# Patient Record
Sex: FEMALE | HISPANIC OR LATINO | Employment: OTHER | URBAN - METROPOLITAN AREA
[De-identification: names, ages, dates, MRNs, and addresses within clinical notes are randomized per-mention and may not be internally consistent; named-entity substitution may affect disease eponyms.]

---

## 2019-07-05 ENCOUNTER — APPOINTMENT (OUTPATIENT)
Dept: GENERAL RADIOLOGY | Facility: HOSPITAL | Age: 69
End: 2019-07-05

## 2019-07-05 PROCEDURE — 99283 EMERGENCY DEPT VISIT LOW MDM: CPT

## 2019-07-05 PROCEDURE — 96372 THER/PROPH/DIAG INJ SC/IM: CPT

## 2019-07-05 PROCEDURE — 71046 X-RAY EXAM CHEST 2 VIEWS: CPT

## 2019-07-06 ENCOUNTER — HOSPITAL ENCOUNTER (EMERGENCY)
Facility: HOSPITAL | Age: 69
Discharge: HOME | End: 2019-07-06
Attending: EMERGENCY MEDICINE | Admitting: EMERGENCY MEDICINE

## 2019-07-06 VITALS
SYSTOLIC BLOOD PRESSURE: 156 MMHG | BODY MASS INDEX: 27.31 KG/M2 | OXYGEN SATURATION: 95 % | DIASTOLIC BLOOD PRESSURE: 94 MMHG | WEIGHT: 160 LBS | HEIGHT: 64 IN | RESPIRATION RATE: 16 BRPM | HEART RATE: 84 BPM | TEMPERATURE: 98.5 F

## 2019-07-06 DIAGNOSIS — J06.0 ACUTE LARYNGOPHARYNGITIS: Primary | ICD-10-CM

## 2019-07-06 PROCEDURE — 25010000002 TRIAMCINOLONE PER 10 MG: Performed by: NURSE PRACTITIONER

## 2019-07-06 RX ORDER — TRIAMCINOLONE ACETONIDE 40 MG/ML
80 INJECTION, SUSPENSION INTRA-ARTICULAR; INTRAMUSCULAR ONCE
Status: COMPLETED | OUTPATIENT
Start: 2019-07-06 | End: 2019-07-06

## 2019-07-06 RX ORDER — AZITHROMYCIN 250 MG/1
TABLET, FILM COATED ORAL
Qty: 6 TABLET | Refills: 0 | Status: SHIPPED | OUTPATIENT
Start: 2019-07-06

## 2019-07-06 RX ORDER — METHYLPREDNISOLONE 4 MG/1
TABLET ORAL
Qty: 21 TABLET | Refills: 0 | Status: SHIPPED | OUTPATIENT
Start: 2019-07-06

## 2019-07-06 RX ORDER — AZITHROMYCIN 250 MG/1
500 TABLET, FILM COATED ORAL ONCE
Status: COMPLETED | OUTPATIENT
Start: 2019-07-06 | End: 2019-07-06

## 2019-07-06 RX ADMIN — AZITHROMYCIN 500 MG: 250 TABLET, FILM COATED ORAL at 04:13

## 2019-07-06 RX ADMIN — HYDROCODONE POLISTIREX AND CHLORPHENIRAMINE POLISTIREX 5 ML: 10; 8 SUSPENSION, EXTENDED RELEASE ORAL at 04:13

## 2019-07-06 RX ADMIN — TRIAMCINOLONE ACETONIDE 80 MG: 40 INJECTION, SUSPENSION INTRA-ARTICULAR; INTRAMUSCULAR at 04:10

## 2019-07-06 NOTE — DISCHARGE INSTRUCTIONS
Medications as directed.  Do not drive this morning.  Follow-up with your primary care provider to monitor your recovery.  Take your next dose of antibiotics on early Sunday morning.  Drink ample clear fluids.  You may start the steroid Dosepak on Sunday if it is necessary.  Consider taking the cough medication only at nighttime.  Return to the emergency department as needed for worsening symptoms or concerns.  Thank you    Follow up with one of the [facility] Primary Care Providers below to setup primary care. If you need assistance coordinating a primary care appointment with a [facility] Primary Care Provider, please contact the Primary Care Coordinators at [phone] for appointment scheduling.    [facility], Primary Care   [address], [address]   [place], [place] [id number] [phone]    [facility] Internal Medicine & Endocrinology  [address], [address]  [place] [address] [phone]    [facility] Family Medicine  [address], [address]   [place] [address] [phone]    [facility] Primary Care  [address], [address]  [place], [place] [address] [phone]    [facility], Primary Care,   [address], [address]   [place], [place] [address] [phone]    [facility] Primary Care  [address]  [place] [address]   [phone]    [facility], Primary Care  [address], [address]  [place], [place] [id number] [phone]    [facility] Internal Medicine & Pediatrics  [address]   [place] [address] [phone]    [facility], Primary Care  [address] [place] [address] [phone]      [facility] Primary Care  [address] [place] [address], [address]   [place]  [address] [phone]    [facility] Family Medicine  [address] [place] [address] [place] [address] [phone]

## 2019-07-11 NOTE — ED PROVIDER NOTES
Subjective   Patient presents to the emergency department in the company of her daughter who is serving as  to the patient's satisfaction.  Further history reveals that the patient began having a rhinorrhea and mild sore throat 2 days ago with advancing to cough that has been far more frequent in the last evening.  She has had no fever.  She feels short of breath but only when she coughs.  She has been eating, drinking, and voiding well.        History provided by:  Patient   used: No    URI   Presenting symptoms: congestion, cough, fatigue, rhinorrhea and sore throat    Presenting symptoms: no ear pain and no facial pain    Severity:  Moderate  Onset quality:  Gradual  Duration:  3 days  Timing:  Intermittent  Progression:  Worsening  Chronicity:  New  Relieved by:  Nothing  Worsened by:  Nothing  Ineffective treatments:  None tried  Associated symptoms: no arthralgias, no neck pain, no sinus pain and no wheezing    Risk factors: being elderly    Risk factors: no diabetes mellitus and no immunosuppression        Review of Systems   Constitutional: Positive for fatigue.   HENT: Positive for congestion, rhinorrhea and sore throat. Negative for ear pain and sinus pain.    Respiratory: Positive for cough. Negative for wheezing.    Musculoskeletal: Negative for arthralgias and neck pain.   All other systems reviewed and are negative.      Past Medical History:   Diagnosis Date   • Arthrosis        No Known Allergies    Past Surgical History:   Procedure Laterality Date   • CHOLECYSTECTOMY         History reviewed. No pertinent family history.    Social History     Socioeconomic History   • Marital status:      Spouse name: Not on file   • Number of children: Not on file   • Years of education: Not on file   • Highest education level: Not on file           Objective   Physical Exam   Constitutional: She is oriented to person, place, and time. She appears well-developed and  "well-nourished. No distress.   HENT:   Head: Normocephalic.   Mouth/Throat: Oropharynx is clear and moist.   Eyes: Conjunctivae and EOM are normal. Pupils are equal, round, and reactive to light.   Neck: Normal range of motion. Neck supple.   Cardiovascular: Normal rate and regular rhythm.   Pulmonary/Chest: Effort normal and breath sounds normal. No respiratory distress. She has no wheezes. She has no rales.   Abdominal: Soft. Bowel sounds are normal. She exhibits no distension. There is no rebound and no guarding.   Musculoskeletal: Normal range of motion. She exhibits no edema.   Neurological: She is alert and oriented to person, place, and time.   Skin: Skin is warm and dry. Capillary refill takes less than 2 seconds.   Psychiatric: She has a normal mood and affect. Her behavior is normal. Judgment and thought content normal.   Nursing note and vitals reviewed.      Procedures           ED Course      No results found for this or any previous visit (from the past 24 hour(s)).  Note: In addition to lab results from this visit, the labs listed above may include labs taken at another facility or during a different encounter within the last 24 hours. Please correlate lab times with ED admission and discharge times for further clarification of the services performed during this visit.    XR Chest 2 View   Final Result   Negative chest.      Signer Name: Bairon Izaguirre MD    Signed: 7/5/2019 10:54 PM    Workstation Name: [id number]            Vitals:    07/05/19 2212 07/06/19 0239 07/06/19 0436   BP: 136/80  156/94   BP Location: Left arm     Patient Position: Sitting     Pulse: 82 91 84   Resp: 16  16   Temp: 98.7 °F (37.1 °C)  98.5 °F (36.9 °C)   TempSrc: Oral  Oral   SpO2: 95% 96% 95%   Weight: 72.6 kg (160 lb)     Height: 162.6 cm (64\")       Medications   triamcinolone acetonide (KENALOG-40) injection 80 mg (80 mg Intramuscular Given 7/6/19 0410)   azithromycin (ZITHROMAX) tablet 500 mg (500 mg Oral Given " 7/6/19 0413)   HYDROcod Polst-CPM Polst ER (TUSSIONEX PENNKINETIC) 10-8 MG/5ML ER suspension 5 mL (5 mL Oral Given 7/6/19 0413)     ECG/EMG Results (last 24 hours)     ** No results found for the last 24 hours. **        No orders to display                   MDM      Final diagnoses:   Acute laryngopharyngitis            Maribel Daugherty, APRN  07/11/19 0058

## 2024-11-12 ENCOUNTER — OFFICE VISIT (OUTPATIENT)
Dept: URGENT CARE | Age: 74
End: 2024-11-12

## 2024-11-12 ENCOUNTER — ANCILLARY PROCEDURE (OUTPATIENT)
Dept: URGENT CARE | Age: 74
End: 2024-11-12

## 2024-11-12 VITALS
TEMPERATURE: 97.9 F | HEART RATE: 65 BPM | DIASTOLIC BLOOD PRESSURE: 75 MMHG | SYSTOLIC BLOOD PRESSURE: 168 MMHG | OXYGEN SATURATION: 94 % | RESPIRATION RATE: 18 BRPM

## 2024-11-12 DIAGNOSIS — R03.0 ELEVATED BLOOD PRESSURE READING WITHOUT DIAGNOSIS OF HYPERTENSION: Primary | ICD-10-CM

## 2024-11-12 DIAGNOSIS — R05.9 COUGH, UNSPECIFIED TYPE: ICD-10-CM

## 2024-11-12 LAB — POC SARS-COV-2 AG BINAX: NORMAL

## 2024-11-12 PROCEDURE — 1159F MED LIST DOCD IN RCRD: CPT | Performed by: PHYSICIAN ASSISTANT

## 2024-11-12 PROCEDURE — 1160F RVW MEDS BY RX/DR IN RCRD: CPT | Performed by: PHYSICIAN ASSISTANT

## 2024-11-12 PROCEDURE — 71046 X-RAY EXAM CHEST 2 VIEWS: CPT | Performed by: PHYSICIAN ASSISTANT

## 2024-11-12 PROCEDURE — 87811 SARS-COV-2 COVID19 W/OPTIC: CPT | Performed by: PHYSICIAN ASSISTANT

## 2024-11-12 PROCEDURE — 99203 OFFICE O/P NEW LOW 30 MIN: CPT | Performed by: PHYSICIAN ASSISTANT

## 2024-11-12 RX ORDER — BENZONATATE 200 MG/1
200 CAPSULE ORAL 3 TIMES DAILY PRN
Qty: 21 CAPSULE | Refills: 0 | Status: SHIPPED | OUTPATIENT
Start: 2024-11-12 | End: 2024-11-19

## 2024-11-12 RX ORDER — PANTOPRAZOLE SODIUM 20 MG/1
20 TABLET, DELAYED RELEASE ORAL
COMMUNITY

## 2024-11-12 NOTE — PATIENT INSTRUCTIONS
Home Care:   1. Take medications as prescribed and follow up with your Primary Care Physician.   2. Use a cool mist vaporizer if the air is dry.   3. Take fever measures. Tylenol or ibuprofen for pain/fever as needed.   4. Do not smoke, and avoid second hand smoke this will worsening wheezing and coughing.    Call 911 or go to the closest emergency department if worse or:   1. If you have more difficulty breathing, or chest pain.   2. Worsening shortness of breath  3.  Fever > 102.  4. Color is pale or blue/gray in the lips or fingernails (call 911).

## 2024-11-12 NOTE — PROGRESS NOTES
Subjective   Patient ID: Maria Isabel Thompson is a 74 y.o. female. They present today with a chief complaint of Cough (Dry cough for 2 days).    History of Present Illness  HPI  Patient presents to the urgent care as a 74-year-old female with a 2-day history of dry cough.  Patient denies any other symptoms.  Patient is here visiting her daughter from [place] patient quit smoking 20 years ago.  Patient's symptoms improved with Robitussin, however she did not like how sleepy it made her.    Past Medical History  Allergies as of 11/12/2024    (No Known Allergies)       (Not in a hospital admission)       History reviewed. No pertinent past medical history.    History reviewed. No pertinent surgical history.         Review of Systems  Review of Systems  Patient denies sore throat, fever, nausea, diarrhea, vomiting, rash, dizziness, shortness of breath, increased urinary frequency, chills, peripheral edema, abdominal pain, chest pain.                             Objective    Vitals:    11/12/24 0816   BP: 168/75   Pulse: 65   Resp: 18   Temp: 36.6 °C (97.9 °F)   SpO2: 94%     No LMP recorded.    Physical Exam  Appearance: Alert, oriented, cooperative, in no acute distress. Well nourished & well hydrated.    Skin: Intact, no lesions, rash, petechiae or purpura.     Eyes: Conjunctiva pink with no redness or exudates. Eyelids without lesions. No scleral icterus.     ENT: Hearing grossly intact. External inspection of ears without lesions or erythema. no nasal flaring. no tripoding, no drooling, no difficulty swallowing oral secretions.    Pulmonary: Clear bilaterally with good chest wall excursion. No rales, rhonchi or wheezing. No accessory muscle use or stridor. No difficulty completing a full sentence without taking a breath, no labored breathing w ambulation     Cardiac: Normal S1, S2, no rub, gallop. No JVD.    Musculoskeletal: no edema, or deformity. No cyanosis, clubbing.    Neurological: no focal findings  identified.    Psychiatric: Appropriate mood and affect.    Procedures    Point of Care Test & Imaging Results from this visit  Results for orders placed or performed in visit on 11/12/24   POCT Covid-19 Rapid Antigen   Result Value Ref Range    POC LAUREN-COV-2 AG  Presumptive negative test for SARS-CoV-2 (no antigen detected)     Presumptive negative test for SARS-CoV-2 (no antigen detected)      XR chest 2 views    Result Date: 11/12/2024  Interpreted By:  Ricardo Miranda, STUDY: XR CHEST 2 VIEWS;  11/12/2024 8:51 am   INDICATION: Signs/Symptoms:cough. ,R05.9 Cough, unspecified   COMPARISON: None.   ACCESSION NUMBER(S): [id number]   ORDERING CLINICIAN: SARATH BUSBY   TECHNIQUE: PA and lateral views of the chest were obtained.   FINDINGS: MEDIASTINUM/LUNGS/ELBERT:   No cardiomegaly, vascular congestion, or pleural effusion. Mural calcifications in the thoracic aorta. No abnormal opacity in either lung worrisome for tumor or pneumonia. No pneumothorax. No tracheal deviation. No abnormal hilar fullness or gross mass on either side.   BONES: No lytic or blastic destructive bone lesion.   UPPER ABDOMEN: Right upper quadrant abdominal surgical clips.       Previous cholecystectomy.   Mild mural calcifications in the thoracic aorta.   Otherwise, negative exam.   MACRO: None   Signed by: Ricardo Miranda 11/12/2024 9:27 AM Dictation workstation:   [id number]     Diagnostic study results (if any) were reviewed by Sarath Busby PA-C.    Assessment/Plan   Allergies, medications, history, and pertinent labs/EKGs/Imaging reviewed by Sarath Busby PA-C.     Medical Decision Making  Patient presents to the urgent care as a 74-year-old female with a 2-day history of dry cough.  Patient denies any other symptoms.  Patient is here visiting her daughter from [place] patient quit smoking 20 years ago.  Considerations for patient's symptoms include viral/bacterial illness, postnasal drip syndrome, reactive airway,  seasonal allergies.  Rapid COVID test negative  Preliminary review of chest x-ray shows no acute cardiopulmonary pathology, costophrenic angles well-visualized, no opacities, infiltrates, or pleural effusions, no pneumothorax, trachea midline.  At this time given patient does not have any other symptoms I recommend trying daily antihistamine like Claritin or Zyrtec, and benzonatate as needed for cough suppression.    Patient denies any prior history of hypertension, believes it may be due to taking cold medications. Pt will recheck w PCP. Patient and patient's daughter verbalized understanding and agreement with treatment plan, all questions were answered.    Orders and Diagnoses  Diagnoses and all orders for this visit:  Elevated blood pressure reading without diagnosis of hypertension  Cough, unspecified type  -     XR chest 2 views  -     POCT Covid-19 Rapid Antigen  -     benzonatate (Tessalon) 200 mg capsule; Take 1 capsule (200 mg) by mouth 3 times a day as needed for cough for up to 7 days. Do not crush or chew.      Medical Admin Record      Patient disposition: Home    Electronically signed by Arely Gallo PA-C  9:44 AM